# Patient Record
Sex: MALE | Race: BLACK OR AFRICAN AMERICAN
[De-identification: names, ages, dates, MRNs, and addresses within clinical notes are randomized per-mention and may not be internally consistent; named-entity substitution may affect disease eponyms.]

---

## 2020-11-25 ENCOUNTER — HOSPITAL ENCOUNTER (EMERGENCY)
Dept: HOSPITAL 61 - ER | Age: 25
Discharge: HOME | End: 2020-11-25
Payer: COMMERCIAL

## 2020-11-25 VITALS — BODY MASS INDEX: 24.94 KG/M2 | HEIGHT: 66 IN | WEIGHT: 155.21 LBS

## 2020-11-25 VITALS — SYSTOLIC BLOOD PRESSURE: 142 MMHG | DIASTOLIC BLOOD PRESSURE: 78 MMHG

## 2020-11-25 DIAGNOSIS — F12.10: ICD-10-CM

## 2020-11-25 DIAGNOSIS — F15.10: Primary | ICD-10-CM

## 2020-11-25 DIAGNOSIS — F41.9: ICD-10-CM

## 2020-11-25 DIAGNOSIS — R00.0: ICD-10-CM

## 2020-11-25 DIAGNOSIS — F17.200: ICD-10-CM

## 2020-11-25 DIAGNOSIS — Z88.2: ICD-10-CM

## 2020-11-25 LAB
AMPHETAMINE/METHAMPHETAMINE: (no result)
BARBITURATES UR-MCNC: (no result) UG/ML
BENZODIAZ UR-MCNC: (no result) UG/L
CANNABINOIDS UR-MCNC: (no result) UG/L
COCAINE UR-MCNC: (no result) NG/ML
METHADONE SERPL-MCNC: (no result) NG/ML
OPIATES UR-MCNC: (no result) NG/ML
PCP SERPL-MCNC: (no result) MG/DL

## 2020-11-25 PROCEDURE — 99283 EMERGENCY DEPT VISIT LOW MDM: CPT

## 2020-11-25 PROCEDURE — 96360 HYDRATION IV INFUSION INIT: CPT

## 2020-11-25 PROCEDURE — 96361 HYDRATE IV INFUSION ADD-ON: CPT

## 2020-11-25 PROCEDURE — 80307 DRUG TEST PRSMV CHEM ANLYZR: CPT

## 2020-11-25 NOTE — PHYS DOC
Past Medical History


Past Medical History:  Other


Additional Past Medical Histor:  HIV


Past Surgical History:  No Surgical History


Smoking Status:  Current Every Day Smoker


Alcohol Use:  Occasionally





General Adult


EDM:


Chief Complaint:  SUBSTANCE ABUSE





HPI:


HPI:





Patient is a 25  year old male with history of drug abuse who presents to the ED

today stating he smoked meth earlier today then sniffed it this evening as well 

as used marijuana, he states his heart started racing and he became anxious. He 

called 911 and was brought to the ED. Patient has been on his phone for quite 

some time. We have tried to ask him to get off the phone but he will not.





Review of Systems:


Review of Systems:


Constitutional:   Denies fever or chills. []


Eyes:   Denies change in visual acuity. []


HENT:   Denies nasal congestion or sore throat. [] 


Respiratory:   Denies cough or shortness of breath. [] 


Cardiovascular: Reports tachycardia


:  Denies dysuria. [] 


Musculoskeletal:   Denies back pain or joint pain. [] 


Integument:   Denies rash. [] 


Neurologic:   Denies headache, focal weakness or sensory changes. [] 


Psychiatric: Reports using methamphetamine and marijuana]





Heart Score:


Risk Factors:


Risk Factors:  DM, Current or recent (<one month) smoker, HTN, HLP, family 

history of CAD, obesity.


Risk Scores:


Score 0 - 3:  2.5% MACE over next 6 weeks - Discharge Home


Score 4 - 6:  20.3% MACE over next 6 weeks - Admit for Clinical Observation


Score 7 - 10:  72.7% MACE over next 6 weeks - Early Invasive Strategies





Allergies:


Allergies:





Allergies








Coded Allergies Type Severity Reaction Last Updated Verified


 


  Sulfa (Sulfonamide Antibiotics) Allergy Unknown  11/25/20 Yes











Physical Exam:


PE:





Constitutional: Well developed, well nourished, no acute distress, non-toxic 

appearance. []


HENT: Normocephalic, atraumatic, bilateral external ears normal, oropharynx 

moist, no oral exudates, nose normal. []


Eyes: PERRLA, EOMI, conjunctiva normal, no discharge. [] 


Neck: Normal range of motion, no tenderness, supple, no stridor. [] 


Cardiovascular: Tachycardic


Lungs & Thorax:  Bilateral breath sounds clear to auscultation []


Abdomen: Bowel sounds normal, soft, no tenderness, no masses, no pulsatile 

masses. [] 


Skin: Warm, dry, no erythema, no rash. [] 


Back: No tenderness, no CVA tenderness. [] 


Extremities: No tenderness, no cyanosis, no clubbing, ROM intact, no edema. [] 


Neurologic: Alert and oriented X 3, normal motor function, normal sensory 

function, no focal deficits noted. []


Psychologic: Appears anxious, on the phone quite a bit





Current Patient Data:


Vital Signs:





                                   Vital Signs








  Date Time  Temp Pulse Resp B/P (MAP) Pulse Ox O2 Delivery O2 Flow Rate FiO2


 


11/25/20 20:42 98.0 124 24 168/85 (112) 100 NonRebreather Mask  





 98.0       











EKG:


EKG:


[]





Radiology/Procedures:


Radiology/Procedures:


[]





Course & Med Decision Making:


Course & Med Decision Making


Pertinent Labs and Imaging studies reviewed. (See chart for details)





This is a 25-year-old male patient presenting to the ED today complaining of his

 heart racing as well as anxiety after smoking and sniffing methamphetamine. 

Patient's heart rate has been in the 115-120s when I walked into the room. He 

has been on the phone since he came to the ED. We have tried to encourage him to

 get off the phone so we can address his symptoms but he continues to be on the 

phone.





We were able to give him a liter of fluid other than that patient has been on 

the phone quite a bit.  He is soft it does come down to 83 with blood pressure 

in the 140s over 60s.  He was discharged to home.  He will provided a advised 

to.  Offered rehab, he stated he does not want to go to rehab.  In fact he 

states he has been doing methamphetamine for 1 year and does not need help.





Dragon Disclaimer:


Dragon Disclaimer:


This electronic medical record was generated, in whole or in part, using a voice

 recognition dictation system.





Departure


Departure


Impression:  


   Primary Impression:  


   Methamphetamine use


   Additional Impressions:  


   Marijuana use


   Tachycardia


Disposition:  01 DC HOME SELF CARE/HOMELESS


Condition:  STABLE


Patient Instructions:  Drug Abuse, FAQs





Additional Instructions:  


You were evaluated in the emergency room, consider getting help for drug use.  

[facility] is available and offers this service











JESSI JACKSON              Nov 25, 2020 21:02

## 2021-02-19 ENCOUNTER — HOSPITAL ENCOUNTER (EMERGENCY)
Dept: HOSPITAL 63 - ER | Age: 26
Discharge: HOME | End: 2021-02-19
Payer: SELF-PAY

## 2021-02-19 VITALS — HEIGHT: 68 IN | BODY MASS INDEX: 28.07 KG/M2 | WEIGHT: 185.19 LBS

## 2021-02-19 VITALS — DIASTOLIC BLOOD PRESSURE: 81 MMHG | SYSTOLIC BLOOD PRESSURE: 142 MMHG

## 2021-02-19 DIAGNOSIS — J45.909: ICD-10-CM

## 2021-02-19 DIAGNOSIS — F19.10: Primary | ICD-10-CM

## 2021-02-19 DIAGNOSIS — Z88.2: ICD-10-CM

## 2021-02-19 PROCEDURE — 99283 EMERGENCY DEPT VISIT LOW MDM: CPT

## 2021-02-19 NOTE — PHYS DOC
Past History


Past Medical History:  Asthma, Other


Additional Past Medical Histor:  HIV


 (TESHA HARRELL)


Past Surgical History:  No Surgical History


 (TESHA HARRELL)


Alcohol Use:  None


 (TESHA HARRELL)





General Adult


EDM:


Chief Complaint:  SORE THROAT





HPI:


HPI:





Patient is a 25-year-old AA male who presents emergency department with 

complaints of just not feeling right.  Patient reports he took some vitamin C in

his daily HIV medications and is concerned that he may have taken too much 

vitamin C.  Patient reports he has had difficulty sleeping recently.  He states 

that he last used methamphetamine 3 days ago.  Patient reports earlier today he 

smoked a blunt with a friend and he just has not felt normal since.  I asked the

patient if there was any possibility that the marijuana had traces of 

methamphetamine in it.  The patient reported that the friend is known to do 

methamphetamine as well as a marijuana.  Patient denies any palpitations, chest 

pain, fever, shortness of breath, abdominal pain, nausea, vomiting, diarrhea, 

ear pain, headache, vision changes, numbness, tingling, or weakness.  Patient 

states he just feels like he cannot sit still.  He currently denies any pain.


 (TESHA HARRELL)





Review of Systems:


Review of Systems:


Complete ROS is negative unless otherwise noted in HPI.


 (TESHA HARRELL)





Allergies:


Allergies:





Allergies








Coded Allergies Type Severity Reaction Last Updated Verified


 


  Sulfa (Sulfonamide Antibiotics) Allergy Unknown  2/19/21 Yes








 (TESHA HARRELL)





Physical Exam:


PE:


See Above


Constitutional: Well developed, well nourished, no acute distress, non-toxic 

appearance, anxious. []


HENT: Normocephalic, atraumatic, bilateral external ears normal, nose normal. []


Eyes: PERRLA, EOMI, conjunctiva normal, no discharge. [] 


Neck: Normal range of motion, no stridor. [] 


Cardiovascular:Heart rate regular rhythm


Lungs & Thorax:  Respirations even and unlabored, no retractions, no respiratory

 distress


Skin: Warm, dry, no erythema, no rash. [] 


Extremities: No cyanosis, ROM intact, no edema. [] 


Neurologic: Alert and oriented X 3, no focal deficits noted. []


Psychologic: Affect junk, judgement normal, mood anxious


 (TESHA HARRELL)





Current Patient Data:


Vital Signs:





                                   Vital Signs








  Date Time  Temp Pulse Resp B/P (MAP) Pulse Ox O2 Delivery O2 Flow Rate FiO2


 


2/19/21 17:37 98.2 111 16 142/81 (101) 99 Room Air  








 (TESHA HARRELL)





EKG:


EKG:


[]


 (TESHA HARRELL)





Radiology/Procedures:


Radiology/Procedures:


[]


 (TESHA HARRELL)





Heart Score:


Risk Factors:


Risk Factors:  DM, Current or recent (<one month) smoker, HTN, HLP, family 

history of CAD, obesity.


Risk Scores:


Score 0 - 3:  2.5% MACE over next 6 weeks - Discharge Home


Score 4 - 6:  20.3% MACE over next 6 weeks - Admit for Clinical Observation


Score 7 - 10:  72.7% MACE over next 6 weeks - Early Invasive Strategies


 (TESHA HARRELL)





Course & Med Decision Making:


Course & Med Decision Making


Pertinent Labs and Imaging studies reviewed. (See chart for details)


Patient presented to the emergency department with complaints of just not 

feeling right.  After discussion with patient and wanted him that I felt like 

possibility that he smoked marijuana that was laced with methamphetamines.  The 

patient agreed that this is after the patient treatment for drug addiction.  

Treatment.  He denies any suicidal and requested to go home.  Patient was urged 

to stop using illicit street drugs.  I recommended that he follows up with his 

primary care doctor for reevaluation next week, return to the ER if symptoms 

worsen or fever develop.





Patient verbalized an understanding of home care, medications, follow-up, and 

return to ED instructions and was in agreement with the plan of care.


[]


 (TESHA HARRELL)


Course & Med Decision Making


Did not see or evaluate patient.  Agree with NP's work-up and disposition per 

note.


 (URSULA SPARKS MD)


Oscar Disclaimer:


Dragon Disclaimer:


This electronic medical record was generated, in whole or in part, using a voice

 recognition dictation system.


 (TESHA HARRELL)





Departure


Departure:


Impression:  


   Primary Impression:  


   Drug abuse


Disposition:  01 DC HOME SELF CARE/HOMELESS


Condition:  STABLE


Patient Instructions:  Drug Abuse, FAQs





Additional Instructions:  


Stop using street drugs. Follow up with your primary care doctor next week. 

Return to the ER if symptoms worsen.











TESHA HARRELL       Feb 19, 2021 17:55


URSULA SPARKS MD               Feb 19, 2021 21:36